# Patient Record
Sex: FEMALE | Race: WHITE | NOT HISPANIC OR LATINO | URBAN - METROPOLITAN AREA
[De-identification: names, ages, dates, MRNs, and addresses within clinical notes are randomized per-mention and may not be internally consistent; named-entity substitution may affect disease eponyms.]

---

## 2021-07-08 ENCOUNTER — OFFICE VISIT (OUTPATIENT)
Dept: URBAN - METROPOLITAN AREA CLINIC 70 | Facility: CLINIC | Age: 71
End: 2021-07-08
Payer: MEDICARE

## 2021-07-08 ENCOUNTER — LAB OUTSIDE AN ENCOUNTER (OUTPATIENT)
Dept: URBAN - METROPOLITAN AREA CLINIC 70 | Facility: CLINIC | Age: 71
End: 2021-07-08

## 2021-07-08 ENCOUNTER — WEB ENCOUNTER (OUTPATIENT)
Dept: URBAN - METROPOLITAN AREA CLINIC 70 | Facility: CLINIC | Age: 71
End: 2021-07-08

## 2021-07-08 DIAGNOSIS — E87.1 HYPONATREMIA: ICD-10-CM

## 2021-07-08 DIAGNOSIS — Z86.010 HISTORY OF COLONIC POLYPS: ICD-10-CM

## 2021-07-08 PROCEDURE — 99203 OFFICE O/P NEW LOW 30 MIN: CPT | Performed by: NURSE PRACTITIONER

## 2021-07-08 RX ORDER — METFORMIN HYDROCHLORIDE 1000 MG/1
TABLET ORAL
Qty: 180 | Status: ACTIVE | COMMUNITY

## 2021-07-08 RX ORDER — LISINOPRIL 20 MG/1
TABLET ORAL
Qty: 180 | Status: ACTIVE | COMMUNITY

## 2021-07-08 RX ORDER — BUSPIRONE HYDROCHLORIDE 10 MG/1
TABLET ORAL
Qty: 270 | Status: ACTIVE | COMMUNITY

## 2021-07-08 RX ORDER — ATORVASTATIN CALCIUM 40 MG/1
TABLET, FILM COATED ORAL
Qty: 90 | Status: ACTIVE | COMMUNITY

## 2021-07-08 RX ORDER — LEVOTHYROXINE SODIUM 75 UG/1
TABLET ORAL
Qty: 96 | Status: ACTIVE | COMMUNITY

## 2021-07-08 RX ORDER — AMLODIPINE BESYLATE 5 MG/1
TABLET ORAL
Qty: 180 | Status: ACTIVE | COMMUNITY

## 2021-07-08 RX ORDER — POTASSIUM CHLORIDE 750 MG/1
TABLET, EXTENDED RELEASE ORAL
Qty: 30 | Status: ACTIVE | COMMUNITY

## 2021-07-08 NOTE — HPI-TODAY'S VISIT:
Pt is a 71 yr old female who presents today for a colonoscopy. Reports colonoscopy in 2005 with polyps removed. Denies rectal bleeding, abnormal wt loss, abdominal pain, or family hx of colon cancer. She does report hx of low sodium in 120's requiring sodium tablets over past several years intermittently.

## 2021-07-22 ENCOUNTER — OFFICE VISIT (OUTPATIENT)
Dept: URBAN - METROPOLITAN AREA CLINIC 118 | Facility: CLINIC | Age: 71
End: 2021-07-22

## 2021-10-14 ENCOUNTER — TELEPHONE ENCOUNTER (OUTPATIENT)
Dept: URBAN - METROPOLITAN AREA CLINIC 92 | Facility: CLINIC | Age: 71
End: 2021-10-14

## 2021-10-15 ENCOUNTER — LAB OUTSIDE AN ENCOUNTER (OUTPATIENT)
Dept: URBAN - METROPOLITAN AREA CLINIC 92 | Facility: CLINIC | Age: 71
End: 2021-10-15

## 2021-10-15 ENCOUNTER — TELEPHONE ENCOUNTER (OUTPATIENT)
Dept: URBAN - METROPOLITAN AREA CLINIC 92 | Facility: CLINIC | Age: 71
End: 2021-10-15

## 2021-10-15 PROBLEM — 428283002: Status: ACTIVE | Noted: 2021-07-08

## 2021-10-26 ENCOUNTER — CLAIMS CREATED FROM THE CLAIM WINDOW (OUTPATIENT)
Dept: URBAN - METROPOLITAN AREA CLINIC 4 | Facility: CLINIC | Age: 71
End: 2021-10-26
Payer: MEDICARE

## 2021-10-26 ENCOUNTER — OFFICE VISIT (OUTPATIENT)
Dept: URBAN - METROPOLITAN AREA SURGERY CENTER 24 | Facility: SURGERY CENTER | Age: 71
End: 2021-10-26

## 2021-10-26 ENCOUNTER — CLAIMS CREATED FROM THE CLAIM WINDOW (OUTPATIENT)
Dept: URBAN - METROPOLITAN AREA SURGERY CENTER 24 | Facility: SURGERY CENTER | Age: 71
End: 2021-10-26
Payer: MEDICARE

## 2021-10-26 DIAGNOSIS — D50.9 ANEMIA: ICD-10-CM

## 2021-10-26 DIAGNOSIS — Z86.010 ADENOMAS PERSONAL HISTORY OF COLONIC POLYPS: ICD-10-CM

## 2021-10-26 DIAGNOSIS — B96.81 BACTERIAL INFECTION DUE TO H. PYLORI: ICD-10-CM

## 2021-10-26 DIAGNOSIS — B96.81 HELICOBACTER PYLORI [H. PYLORI] AS THE CAUSE OF DISEASES CLASSIFIED ELSEWHERE: ICD-10-CM

## 2021-10-26 DIAGNOSIS — K63.89 POLYP, HYPERPLASTIC: ICD-10-CM

## 2021-10-26 DIAGNOSIS — K29.60 OTHER GASTRITIS WITHOUT BLEEDING: ICD-10-CM

## 2021-10-26 DIAGNOSIS — K63.5 BENIGN COLON POLYP: ICD-10-CM

## 2021-10-26 DIAGNOSIS — K29.60 ADENOPAPILLOMATOSIS GASTRICA: ICD-10-CM

## 2021-10-26 PROCEDURE — G8907 PT DOC NO EVENTS ON DISCHARG: HCPCS | Performed by: INTERNAL MEDICINE

## 2021-10-26 PROCEDURE — 88342 IMHCHEM/IMCYTCHM 1ST ANTB: CPT | Performed by: PATHOLOGY

## 2021-10-26 PROCEDURE — 43239 EGD BIOPSY SINGLE/MULTIPLE: CPT | Performed by: INTERNAL MEDICINE

## 2021-10-26 PROCEDURE — 88305 TISSUE EXAM BY PATHOLOGIST: CPT | Performed by: PATHOLOGY

## 2021-10-26 PROCEDURE — 45385 COLONOSCOPY W/LESION REMOVAL: CPT | Performed by: INTERNAL MEDICINE

## 2021-10-26 RX ORDER — POTASSIUM CHLORIDE 750 MG/1
TABLET, EXTENDED RELEASE ORAL
Qty: 30 | Status: ACTIVE | COMMUNITY

## 2021-10-26 RX ORDER — BUSPIRONE HYDROCHLORIDE 10 MG/1
TABLET ORAL
Qty: 270 | Status: ACTIVE | COMMUNITY

## 2021-10-26 RX ORDER — ATORVASTATIN CALCIUM 40 MG/1
TABLET, FILM COATED ORAL
Qty: 90 | Status: ACTIVE | COMMUNITY

## 2021-10-26 RX ORDER — LISINOPRIL 20 MG/1
TABLET ORAL
Qty: 180 | Status: ACTIVE | COMMUNITY

## 2021-10-26 RX ORDER — LEVOTHYROXINE SODIUM 75 UG/1
TABLET ORAL
Qty: 96 | Status: ACTIVE | COMMUNITY

## 2021-10-26 RX ORDER — METFORMIN HYDROCHLORIDE 1000 MG/1
TABLET ORAL
Qty: 180 | Status: ACTIVE | COMMUNITY

## 2021-10-26 RX ORDER — AMLODIPINE BESYLATE 5 MG/1
TABLET ORAL
Qty: 180 | Status: ACTIVE | COMMUNITY

## 2021-11-29 ENCOUNTER — TELEPHONE ENCOUNTER (OUTPATIENT)
Dept: URBAN - METROPOLITAN AREA SURGERY CENTER 30 | Facility: SURGERY CENTER | Age: 71
End: 2021-11-29

## 2021-11-29 RX ORDER — LANSOPRAZOLE 30 MG/1
1 CAPSULE CAPSULE, DELAYED RELEASE ORAL TWICE A DAY
Qty: 28 CAPSULE | OUTPATIENT
Start: 2021-11-30

## 2021-11-29 RX ORDER — CLARITHROMYCIN 500 MG/1
1 TABLET TABLET, FILM COATED ORAL
Qty: 28 TABLET | OUTPATIENT
Start: 2021-11-30 | End: 2021-12-14

## 2021-11-29 RX ORDER — METRONIDAZOLE 500 MG/1
1 TABLET TABLET, FILM COATED ORAL THREE TIMES A DAY
Qty: 42 TABLET | Refills: 1 | OUTPATIENT
Start: 2021-11-30 | End: 2021-12-28

## 2021-12-02 ENCOUNTER — P2P PATIENT RECORD (OUTPATIENT)
Age: 71
End: 2021-12-02

## 2021-12-02 ENCOUNTER — TELEPHONE ENCOUNTER (OUTPATIENT)
Dept: URBAN - METROPOLITAN AREA CLINIC 70 | Facility: CLINIC | Age: 71
End: 2021-12-02

## 2021-12-14 ENCOUNTER — DASHBOARD ENCOUNTERS (OUTPATIENT)
Age: 71
End: 2021-12-14

## 2021-12-14 ENCOUNTER — OFFICE VISIT (OUTPATIENT)
Dept: URBAN - METROPOLITAN AREA CLINIC 70 | Facility: CLINIC | Age: 71
End: 2021-12-14
Payer: MEDICARE

## 2021-12-14 VITALS
BODY MASS INDEX: 24.48 KG/M2 | TEMPERATURE: 97.1 F | HEART RATE: 96 BPM | WEIGHT: 133 LBS | HEIGHT: 62 IN | DIASTOLIC BLOOD PRESSURE: 63 MMHG | SYSTOLIC BLOOD PRESSURE: 121 MMHG

## 2021-12-14 DIAGNOSIS — B96.81 HELICOBACTER PYLORI [H. PYLORI] AS THE CAUSE OF DISEASES CLASSIFIED ELSEWHERE: ICD-10-CM

## 2021-12-14 DIAGNOSIS — K29.70 GASTRITIS, UNSPECIFIED, WITHOUT BLEEDING: ICD-10-CM

## 2021-12-14 PROBLEM — 89538001: Status: ACTIVE | Noted: 2021-12-14

## 2021-12-14 PROCEDURE — 99214 OFFICE O/P EST MOD 30 MIN: CPT | Performed by: REGISTERED NURSE

## 2021-12-14 RX ORDER — METFORMIN HYDROCHLORIDE 1000 MG/1
TABLET ORAL
Qty: 180 | Status: ACTIVE | COMMUNITY

## 2021-12-14 RX ORDER — LEVOTHYROXINE SODIUM 75 UG/1
TABLET ORAL
Qty: 96 | Status: ACTIVE | COMMUNITY

## 2021-12-14 RX ORDER — LANSOPRAZOLE 30 MG/1
1 CAPSULE CAPSULE, DELAYED RELEASE ORAL TWICE A DAY
Qty: 28 CAPSULE | Status: DISCONTINUED | COMMUNITY
Start: 2021-11-30

## 2021-12-14 RX ORDER — METRONIDAZOLE 500 MG/1
1 TABLET TABLET, FILM COATED ORAL THREE TIMES A DAY
Qty: 42 TABLET | Refills: 1 | Status: DISCONTINUED | COMMUNITY
Start: 2021-11-30 | End: 2021-12-28

## 2021-12-14 RX ORDER — ATORVASTATIN CALCIUM 40 MG/1
TABLET, FILM COATED ORAL
Qty: 90 | Status: ACTIVE | COMMUNITY

## 2021-12-14 RX ORDER — POTASSIUM CHLORIDE 750 MG/1
TABLET, EXTENDED RELEASE ORAL
Qty: 30 | Status: ACTIVE | COMMUNITY

## 2021-12-14 RX ORDER — LISINOPRIL 20 MG/1
TABLET ORAL
Qty: 180 | Status: ACTIVE | COMMUNITY

## 2021-12-14 RX ORDER — BUSPIRONE HYDROCHLORIDE 10 MG/1
TABLET ORAL
Qty: 270 | Status: ACTIVE | COMMUNITY

## 2021-12-14 RX ORDER — AMLODIPINE BESYLATE 5 MG/1
TABLET ORAL
Qty: 180 | Status: ACTIVE | COMMUNITY

## 2021-12-14 RX ORDER — CLARITHROMYCIN 500 MG/1
1 TABLET TABLET, FILM COATED ORAL
Qty: 28 TABLET | Status: DISCONTINUED | COMMUNITY
Start: 2021-11-30 | End: 2021-12-14

## 2021-12-14 NOTE — HPI-TODAY'S VISIT:
Note from OV 7/8/21 with DARREN Pena: Pt is a 71 yr old female who presents today for a colonoscopy. Reports colonoscopy in 2005 with polyps removed. Denies rectal bleeding, abnormal wt loss, abdominal pain, or family hx of colon cancer. She does report hx of low sodium in 120's requiring sodium tablets over past several years intermittently. --------------------------------------- TODAY 12/14/21: EGD on 10/26/21 showed hpylori positive gastritis. She was treated with Clarithromycin/Flagyl/lansoprazole. Colonoscopy 10/26/21 showed a hyperplastic polyp. She has a prior history of adenomatous polyps. She completed the hpylori treatment today. She is feeling well for the most part. Reports some occasional anal seepage.

## 2021-12-29 ENCOUNTER — LAB OUTSIDE AN ENCOUNTER (OUTPATIENT)
Dept: URBAN - METROPOLITAN AREA CLINIC 70 | Facility: CLINIC | Age: 71
End: 2021-12-29

## 2022-01-01 LAB
H PYLORI BREATH TEST: NEGATIVE
H. PYLORI BREATH COLLECTION: (no result)

## 2022-01-05 ENCOUNTER — TELEPHONE ENCOUNTER (OUTPATIENT)
Dept: URBAN - METROPOLITAN AREA CLINIC 23 | Facility: CLINIC | Age: 72
End: 2022-01-05